# Patient Record
Sex: MALE | Race: WHITE
[De-identification: names, ages, dates, MRNs, and addresses within clinical notes are randomized per-mention and may not be internally consistent; named-entity substitution may affect disease eponyms.]

---

## 2019-12-11 ENCOUNTER — HOSPITAL ENCOUNTER (EMERGENCY)
Dept: HOSPITAL 75 - ER FS | Age: 24
Discharge: HOME | End: 2019-12-11
Payer: COMMERCIAL

## 2019-12-11 VITALS — HEIGHT: 70.08 IN | WEIGHT: 213.41 LBS | BODY MASS INDEX: 30.55 KG/M2

## 2019-12-11 VITALS — DIASTOLIC BLOOD PRESSURE: 57 MMHG | SYSTOLIC BLOOD PRESSURE: 137 MMHG

## 2019-12-11 DIAGNOSIS — J45.909: ICD-10-CM

## 2019-12-11 DIAGNOSIS — J06.9: Primary | ICD-10-CM

## 2019-12-11 LAB
ALBUMIN SERPL-MCNC: 4.8 GM/DL (ref 3.2–4.5)
ALP SERPL-CCNC: 78 U/L (ref 40–136)
ALT SERPL-CCNC: 13 U/L (ref 0–55)
BASOPHILS # BLD AUTO: 0.1 10^3/UL (ref 0–0.1)
BASOPHILS NFR BLD AUTO: 0 % (ref 0–10)
BILIRUB SERPL-MCNC: 1.4 MG/DL (ref 0.1–1)
BUN/CREAT SERPL: 12
CALCIUM SERPL-MCNC: 9.7 MG/DL (ref 8.5–10.1)
CHLORIDE SERPL-SCNC: 100 MMOL/L (ref 98–107)
CO2 SERPL-SCNC: 25 MMOL/L (ref 21–32)
CREAT SERPL-MCNC: 0.94 MG/DL (ref 0.6–1.3)
EOSINOPHIL # BLD AUTO: 0.2 10^3/UL (ref 0–0.3)
EOSINOPHIL NFR BLD AUTO: 2 % (ref 0–10)
EOSINOPHIL NFR BLD MANUAL: 2 %
ERYTHROCYTE [DISTWIDTH] IN BLOOD BY AUTOMATED COUNT: 12.2 % (ref 10–14.5)
GFR SERPLBLD BASED ON 1.73 SQ M-ARVRAT: > 60 ML/MIN
GLUCOSE SERPL-MCNC: 109 MG/DL (ref 70–105)
HCT VFR BLD CALC: 41 % (ref 40–54)
HGB BLD-MCNC: 14.9 G/DL (ref 13.3–17.7)
LIPASE SERPL-CCNC: 17 U/L (ref 8–78)
LYMPHOCYTES # BLD AUTO: 0.8 X 10^3 (ref 1–4)
LYMPHOCYTES NFR BLD AUTO: 6 % (ref 12–44)
MANUAL DIFFERENTIAL PERFORMED BLD QL: YES
MCH RBC QN AUTO: 30 PG (ref 25–34)
MCHC RBC AUTO-ENTMCNC: 36 G/DL (ref 32–36)
MCV RBC AUTO: 83 FL (ref 80–99)
MONOCYTES # BLD AUTO: 1 X 10^3 (ref 0–1)
MONOCYTES NFR BLD AUTO: 8 % (ref 0–12)
MONOCYTES NFR BLD: 3 %
NEUTROPHILS # BLD AUTO: 10.5 X 10^3 (ref 1.8–7.8)
NEUTROPHILS NFR BLD AUTO: 84 % (ref 42–75)
NEUTS BAND NFR BLD MANUAL: 80 %
NEUTS BAND NFR BLD: 8 %
PLATELET # BLD: 210 10^3/UL (ref 130–400)
PMV BLD AUTO: 10.2 FL (ref 7.4–10.4)
POTASSIUM SERPL-SCNC: 3.9 MMOL/L (ref 3.6–5)
PROT SERPL-MCNC: 7.6 GM/DL (ref 6.4–8.2)
RBC MORPH BLD: NORMAL
SODIUM SERPL-SCNC: 135 MMOL/L (ref 135–145)
TOXIC GRANULES BLD QL SMEAR: (no result)
VARIANT LYMPHS NFR BLD MANUAL: 7 %
WBC # BLD AUTO: 12.5 10^3/UL (ref 4.3–11)

## 2019-12-11 PROCEDURE — 87804 INFLUENZA ASSAY W/OPTIC: CPT

## 2019-12-11 PROCEDURE — 96374 THER/PROPH/DIAG INJ IV PUSH: CPT

## 2019-12-11 PROCEDURE — 84484 ASSAY OF TROPONIN QUANT: CPT

## 2019-12-11 PROCEDURE — 87430 STREP A AG IA: CPT

## 2019-12-11 PROCEDURE — 93005 ELECTROCARDIOGRAM TRACING: CPT

## 2019-12-11 PROCEDURE — 71045 X-RAY EXAM CHEST 1 VIEW: CPT

## 2019-12-11 PROCEDURE — 85027 COMPLETE CBC AUTOMATED: CPT

## 2019-12-11 PROCEDURE — 85007 BL SMEAR W/DIFF WBC COUNT: CPT

## 2019-12-11 PROCEDURE — 80053 COMPREHEN METABOLIC PANEL: CPT

## 2019-12-11 PROCEDURE — 85379 FIBRIN DEGRADATION QUANT: CPT

## 2019-12-11 PROCEDURE — 83690 ASSAY OF LIPASE: CPT

## 2019-12-11 PROCEDURE — 96375 TX/PRO/DX INJ NEW DRUG ADDON: CPT

## 2019-12-11 PROCEDURE — 36415 COLL VENOUS BLD VENIPUNCTURE: CPT

## 2019-12-11 PROCEDURE — 83880 ASSAY OF NATRIURETIC PEPTIDE: CPT

## 2019-12-11 NOTE — DIAGNOSTIC IMAGING REPORT
INDICATION: 

Cough and congestion.



EXAMINATION:

Portable chest.



FINDINGS:

The lungs are well aerated without air trapping. There are no

infiltrates. The heart is not enlarged. No pulmonary edema or

hilar adenopathy. No pneumothorax or pleural effusion. No bony

abnormalities.



IMPRESSION: 

Normal portable chest.



Dictated by: 



  Dictated on workstation # [id number]

## 2022-01-15 ENCOUNTER — HOSPITAL ENCOUNTER (EMERGENCY)
Dept: HOSPITAL 75 - ER FS | Age: 27
Discharge: HOME | End: 2022-01-15
Payer: COMMERCIAL

## 2022-01-15 VITALS — DIASTOLIC BLOOD PRESSURE: 88 MMHG | SYSTOLIC BLOOD PRESSURE: 158 MMHG

## 2022-01-15 DIAGNOSIS — U07.1: Primary | ICD-10-CM

## 2022-01-15 PROCEDURE — 99283 EMERGENCY DEPT VISIT LOW MDM: CPT

## 2022-01-15 PROCEDURE — 87804 INFLUENZA ASSAY W/OPTIC: CPT

## 2022-01-15 PROCEDURE — 87636 SARSCOV2 & INF A&B AMP PRB: CPT

## 2022-01-15 NOTE — ED COUGH/URI
General


Chief Complaint:  Cough/Cold/Flu Symptoms


Stated Complaint:  BODY ACHES;NAUSEA;SOB





History of Present Illness


Date Seen by Provider:  Master 15, 2022


Time Seen by Provider:  06:08


Initial Comments


26-year-old male presents with cough, body aches, nausea, chills, fatigue and 

just not feeling well.  Patient reports that the symptoms started around 3 PM 

yesterday.  He has tried some Tylenol.  Patient has not vomited.





Allergies and Home Medications


Allergies


Coded Allergies:  


     No Known Drug Allergies (Unverified , 12/11/19)





Patient Home Medication List


Home Medication List Reviewed:  Yes


Albuterol Sulfate (Proair Hfa) 1 Puff Puff, 2 PUFF IH Q4H


   Prescribed by: JULIANO MCDONALD on 12/11/19 1422


Albuterol Sulfate (Proair Hfa) 1 Puff Puff, 2 PUFF IH Q4H


   Prescribed by: JULIANO MCDONALD on 12/11/19 1502


Prednisone (Prednisone) 20 Mg Tab, 40 MG PO DAILY


   Prescribed by: JULIANO MCDONALD on 12/11/19 1422


Prednisone (Prednisone) 20 Mg Tab, 40 MG PO DAILY


   Prescribed by: JULIANO MCDONALD on 12/11/19 1502





Review of Systems


Review of Systems


Constitutional:  chills


Respiratory:  cough, short of breath


Cardiovascular:  No chest pain, No palpitations


Gastrointestinal:  No abdominal pain; nausea; No vomiting


Genitourinary:  no symptoms reported


Skin:  no symptoms reported; No rash


Psychiatric/Neurological:  No Symptoms Reported


Hematologic/Lymphatic:  No Symptoms Reported





Past Medical-Social-Family Hx


Patient Social History


Tobacco Use?:  No


Substance use?:  No


Alcohol Use?:  No


Pt feels they are or have been:  No





Seasonal Allergies


Seasonal Allergies:  No





Past Medical History


Surgeries:  No


Respiratory:  No


Cardiac:  No


Neurological:  No


Genitourinary:  No


Gastrointestinal:  No


Musculoskeletal:  No


Endocrine:  No


HEENT:  No


Cancer:  No


Psychosocial:  No


Integumentary:  No


Blood Disorders:  No





Physical Exam





Vital Signs - First Documented








 1/15/22





 06:05


 


Temp 37.6


 


Pulse 100


 


Resp 18


 


B/P (MAP) 170/99 (122)


 


Pulse Ox 96


 


O2 Delivery Room Air





Capillary Refill :


Height: '"


Weight: lbs. oz. kg; 30.00 BMI


Method:


General Appearance:  WD/WN, no apparent distress


Neck:  full range of motion, supple


Respiratory:  lungs clear, normal breath sounds, no respiratory distress, no 

accessory muscle use


Cardiovascular:  normal peripheral pulses, regular rate, rhythm


Gastrointestinal:  non tender, soft


Neurologic/Psychiatric:  alert, normal mood/affect, oriented x 3


Skin:  normal color, warm/dry





Progress/Results/Core Measures


Suspected Sepsis


SIRS


Temperature: 


Pulse:  


Respiratory Rate: 


 


Blood Pressure  / 


Mean:





Results/Orders


Lab Results





Laboratory Tests








Test


 1/15/22


06:10 Range/Units


 


 


Influenza Type A Antigen NEGATIVE  NEGATIVE  


 


Influenza Type B Antigen NEGATIVE  NEGATIVE  








My Orders





Orders - NATHALIE NELSON DO


Covid 19 Inhouse Test (1/15/22 06:16)


Influenza A & B Antigens (1/15/22 06:16)





Vital Signs/I&O











 1/15/22 1/15/22





 06:05 06:41


 


Temp 37.6 37.6


 


Pulse 100 99


 


Resp 18 18


 


B/P (MAP) 170/99 (122) 158/88


 


Pulse Ox 96 97


 


O2 Delivery Room Air Room Air





Capillary Refill :


Progress Note :  


Progress Note


Patient negative for influenza.  Patient with suspected COVID.  Discussed 

supportive care with him.  Patient stable and discharged home patient is O2 

saturations in the upper 90s throughout his stay in the ER





Departure


Impression





   Primary Impression:  


   Suspected COVID-19 virus infection


Disposition:  01 HOME, SELF-CARE


Condition:  Stable





Departure-Patient Inst.


Referrals:  


NO,LOCAL PHYSICIAN (PCP/Family)


Primary Care Physician


Patient Instructions:  COVID-19 ED





Add. Discharge Instructions:  


Tylenol or ibuprofen as needed for fever or body aches


Drink plenty of fluids





All discharge instructions reviewed with patient and/or family. Voiced 

understanding.


Scripts


Ondansetron (Ondansetron Odt) 4 Mg Tab.rapdis


4 MG PO Q6H PRN for NAUSEA/VOMITING, #20 TAB 0 Refills


   Prov: NATHALIE NELSON DO         1/15/22











NATHALIE NELSON DO               Master 15, 2022 06:15